# Patient Record
Sex: FEMALE | Race: WHITE | NOT HISPANIC OR LATINO | Employment: FULL TIME | URBAN - NONMETROPOLITAN AREA
[De-identification: names, ages, dates, MRNs, and addresses within clinical notes are randomized per-mention and may not be internally consistent; named-entity substitution may affect disease eponyms.]

---
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
## 2020-03-25 ENCOUNTER — NURSE TRIAGE (OUTPATIENT)
Dept: PEDIATRICS | Facility: OTHER | Age: 20
End: 2020-03-25

## 2020-03-25 NOTE — TELEPHONE ENCOUNTER
Pt states that she has had a low grade fever for a couple days and is starting to develop a sore throat.

## 2020-03-25 NOTE — TELEPHONE ENCOUNTER
S-(situation): Fever/Sore throat    B-(background): Scoliosis, Asperger's syndrome.    A-(assessment): Symptoms started Monday. 99.1 on Monday. Tuesday 99.2 by the end of day 99.6. No fever right now. Mild cough and sore throat still present. Throat looks a little red. No breathing issues. No sinus drainage. Sinuses feel dry. Patient reported normally she wouldn't go to the doctor or call about these symptoms but because of COVID-19 and the place she works she figured she should.     R-(recommendations): Discussed home care strategies. Discussed COVID-19. Discussed coming to the front of the clinic for an office appointment if she feels she needs to be seen. Patient decided to stay home and try some home care strategies, but if symptoms worsen or return she will come to be checked out. Elizabeth Garza RN  ....................  3/25/2020   1:09 PM    Instructions for Patients ([facility] Specific)  Your symptoms could be due to COVID-19, but it also could be due to a number of other respiratory illnesses.  We are not doing testing at this time for COVID-19 unless symptoms are severe enough to require hospitalization.  It is recommended that you stay home to prevent the spread of your illness.  To do this follow these instructions:    Regardless of if you have been tested or not:  Patient who have symptoms (cough, fever, or shortness of breath), need to isolate for 7 days from when symptoms started AND 72 hours after fever resolves (without fever reducing medications) AND improvement of respiratory symptoms (whichever is longer).      Isolate yourself at home (in own room/own bathroom if possible)    Do Not allow any visitors    Do Not go to work or school    Do Not go to Conf[employer]ian[employer],  centers, shopping, or other public places.    Do Not shake hands.    Avoid close and intimate contact with others (hugging, kissing).    Follow CDC recommendations for household cleaning of frequently touched services.     After the  initial 7 days, continue to isolate yourself from household members as much as possible. To continue decrease the risk of community spread and exposure, you and any members of your household should limit activities in public for 14 days after starting home isolation.     You can reference the following CDC link for helpful home isolation/care tips:  https://www.cdc.gov/coronavirus/2019-ncov/downloads/10Things.pdf    Protect Others:    Cover your mouth and nose with a mask, disposable tissue or wash cloth to avoid spreading germs to others.    Wash your hands and face frequently with soap and water.    Fever Medicines:    For fever relief, take acetaminophen or ibuprofen.    Treat fevers above 101  F (38.3  C) to lower fevers and make you more comfortable.     Acetaminophen (e.g., Tylenol): Take 650 mg (two 325 mg pills) by mouth every 4-6 hours as needed of regular strength Tylenol or 1,000 mg (two 500 mg pills) every 8 hours as needed of Extra Strength Tylenol.     Ibuprofen (e.g., Motrin, Advil): Take 400 mg (two 200 mg pills) by mouth every 6 hours as needed.     Acetaminophen is thought to be safer than ibuprofen or naproxen for people over 65 years old. Acetaminophen is in many OTC and prescription medicines. It might be in more than one medicine that you are taking. You need to be careful and not take an overdose. Before taking any medicine, read all the instructions on the package.    Caution - NSAIDs (e.g., ibuprofen, naproxen): Do not take nonsteroidal anti-inflammatory drugs (NSAIDs) if you have stomach problems, kidney disease, heart failure, or other contraindications to using this type of medicine. Do not take NSAID medicines for over 7 days without consulting your PCP. Do not take NSAID medicines if you are pregnant. Do not take NSAID medicines if you are also taking blood thinners.     Call Back If: Breathing difficulty develops or you become worse.    Thank you for limiting contact with others,  wearing a simple mask to cover your cough, practice good hand hygiene habits and accessing our virtual services where possible to limit the spread of this virus.    For more information about COVID19 and options for caring for yourself at home, please visit the CDC website at https://www.cdc.gov/coronavirus/2019-ncov/about/steps-when-sick.html  For more options for care at [facility], please visit our website at [email or web address]         Additional Information    Sore throat    Negative: Fever present > 3 days (72 hours)    Protocols used: SORE THROAT-A-OH

## 2021-11-29 NOTE — NURSING NOTE
Patient Information     Patient Name MRN Sex Ana Abernathy [id number] Female 2000      Nursing Note by Ami Kimball at 2018 10:00 AM     Author:  Ami Kimball Service:  (none) Author Type:  (none)     Filed:  2018 10:17 AM Encounter Date:  2018 Status:  Signed     :  Ami Kimball            Patient presents with pain in her left breast x 3 weeks.  Ami Kimball LPN .........................2018  10:07 AM           66

## 2022-08-05 LAB
CHOLESTEROL (EXTERNAL): 156 MG/DL (ref 0–200)
HBA1C MFR BLD: 5.2 % (ref 4–5.6)
LDL CHOLESTEROL CALCULATED (EXTERNAL): 88 MG/DL (ref 0–100)
NON HDL CHOLESTEROL (EXTERNAL): 112 MG/DL (ref 0–130)
TRIGLYCERIDES (EXTERNAL): 118 MG/DL

## 2022-12-06 LAB
ALT SERPL-CCNC: 21 IU/L (ref 6–31)
AST SERPL-CCNC: 18 IU/L (ref 10–40)
CREATININE (EXTERNAL): 0.81 MG/DL (ref 0.7–1)
GFR ESTIMATED (EXTERNAL): 105 ML/MIN/1.73M2
GLUCOSE (EXTERNAL): 97 MG/DL (ref 70–99)
POTASSIUM (EXTERNAL): 3.9 MEQ/L (ref 3.4–5.1)

## 2022-12-18 ENCOUNTER — OFFICE VISIT (OUTPATIENT)
Dept: FAMILY MEDICINE | Facility: OTHER | Age: 22
End: 2022-12-18
Attending: NURSE PRACTITIONER
Payer: COMMERCIAL

## 2022-12-18 VITALS
TEMPERATURE: 98.4 F | SYSTOLIC BLOOD PRESSURE: 137 MMHG | OXYGEN SATURATION: 98 % | DIASTOLIC BLOOD PRESSURE: 95 MMHG | HEART RATE: 87 BPM | RESPIRATION RATE: 16 BRPM | BODY MASS INDEX: 40.74 KG/M2 | WEIGHT: 244.8 LBS

## 2022-12-18 DIAGNOSIS — H60.393 SKIN OF BOTH EARLOBES WITH INFECTION: Primary | ICD-10-CM

## 2022-12-18 PROCEDURE — 99213 OFFICE O/P EST LOW 20 MIN: CPT | Performed by: NURSE PRACTITIONER

## 2022-12-18 RX ORDER — CEPHALEXIN 500 MG/1
500 CAPSULE ORAL 3 TIMES DAILY
Qty: 21 CAPSULE | Refills: 0 | Status: SHIPPED | OUTPATIENT
Start: 2022-12-18 | End: 2022-12-25

## 2022-12-18 RX ORDER — TESTOSTERONE CYPIONATE 1000 MG/10ML
50 INJECTION, SOLUTION INTRAMUSCULAR
COMMUNITY
Start: 2022-08-11

## 2022-12-18 ASSESSMENT — PAIN SCALES - GENERAL: PAINLEVEL: MODERATE PAIN (4)

## 2022-12-18 NOTE — NURSING NOTE
"Chief Complaint   Patient presents with     Ear Problem     Earlobes red and painful from earrings for 2 days   He is putting gauges in his ears and the holes are now red, sore and painful for the past 2 days.  Mary Melendez LPN..................12/18/2022   9:56 AM      Initial BP (!) 137/95   Pulse 87   Temp 98.4  F (36.9  C) (Temporal)   Resp 16   Wt 111 kg (244 lb 12.8 oz)   LMP  (LMP Unknown)   SpO2 98%   Breastfeeding No   BMI 40.74 kg/m   Estimated body mass index is 40.74 kg/m  as calculated from the following:    Height as of 6/30/19: 1.651 m (5' 5\").    Weight as of this encounter: 111 kg (244 lb 12.8 oz).  Medication Reconciliation: complete    FOOD SECURITY SCREENING QUESTIONS  Hunger Vital Signs:  Within the past 12 months we worried whether our food would run out before we got money to buy more. Never  Within the past 12 months the food we bought just didn't last and we didn't have money to get more. Never        Advance care directive on file? no  Advance care directive provided to patient? declined     Mary Melendez, CHANTE  "
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
## 2023-02-01 LAB — TSH SERPL-ACNC: 1.99 UIU/ML (ref 0.4–3.99)

## 2023-02-08 ENCOUNTER — TRANSFERRED RECORDS (OUTPATIENT)
Dept: MULTI SPECIALTY CLINIC | Facility: CLINIC | Age: 23
End: 2023-02-08

## 2023-02-08 LAB
C TRACH DNA SPEC QL PROBE+SIG AMP: NOT DETECTED
N GONORRHOEA DNA SPEC QL PROBE+SIG AMP: NOT DETECTED
PAP-ABSTRACT: NORMAL
SPECIMEN DESCRIP: NORMAL
SPECIMEN DESCRIPTION: NORMAL

## 2023-12-30 ENCOUNTER — ALLIED HEALTH/NURSE VISIT (OUTPATIENT)
Dept: FAMILY MEDICINE | Facility: OTHER | Age: 23
End: 2023-12-30
Attending: FAMILY MEDICINE
Payer: COMMERCIAL

## 2023-12-30 DIAGNOSIS — U07.1 COVID-19: Primary | ICD-10-CM

## 2023-12-30 DIAGNOSIS — U07.1 INFECTION DUE TO COVID-19 VIRUS VARIANT OF CONCERN: Primary | ICD-10-CM

## 2023-12-30 LAB — SARS-COV-2 RNA RESP QL NAA+PROBE: POSITIVE

## 2023-12-30 PROCEDURE — 87635 SARS-COV-2 COVID-19 AMP PRB: CPT | Mod: ZL

## 2023-12-30 PROCEDURE — C9803 HOPD COVID-19 SPEC COLLECT: HCPCS

## 2023-12-30 PROCEDURE — 99212 OFFICE O/P EST SF 10 MIN: CPT | Mod: 93 | Performed by: STUDENT IN AN ORGANIZED HEALTH CARE EDUCATION/TRAINING PROGRAM

## 2023-12-30 RX ORDER — TRETINOIN 0.5 MG/G
CREAM TOPICAL
COMMUNITY
Start: 2022-12-06

## 2023-12-30 NOTE — PROGRESS NOTES
"The patient has been notified of following:     \"This telephone visit will be conducted via a call between you and your physician/provider. We have found that certain health care needs can be provided without the need for a physical exam.  This service lets us provide the care you need with a short phone conversation.  If a prescription is necessary we can send it directly to your pharmacy.  If lab work is needed we can place an order for that and you can then stop by our lab to have the test done at a later time.    Telephone visits are billed at different rates depending on your insurance coverage. During this emergency period, for some insurers they may be billed the same as an in-person visit.  Please reach out to your insurance provider with any questions.    If during the course of the call the physician/provider feels a telephone visit is not appropriate, you will not be charged for this service.\"    Patient has given verbal consent for Telephone visit?  Yes    What phone number would you like to be contacted at?     How would you like to obtain your AVS? MyChart    Subjective   CC: Ana Hsieh  is a 23 year old adult who presents via phone visit today for the following health issues: No chief complaint on file.       COVID-19 Symptom Review  How many days ago did these symptoms start? 12/29/23    Are any of the following symptoms significant for you?  New or worsening difficulty breathing? Yes  Please describe what kind of difficulty you are having breathing:Mild dyspnea (able to do ADLs without difficulty, mild shortness of breath with activities such as climbing one or two flights of stairs or walking briskly)  Worsening cough? Yes, I am coughing up mucus.  Fever or chills? Yes, the highest temperature was 101.4F  Headache: YES  Sore throat: YES  Chest pain: No  Diarrhea: No  Body aches? YES    What treatments has patient tried? Acetaminophen, Nonsteroidals, and rest, fluids   Does patient live in a nursing " home, group home, or shelter? No  Does patient have a way to get food/medications during quarantined? Yes, I have a friend or family member who can help me.              Reviewed and updated as needed this visit by Provider      Review of Systems   See HPI relevant review of systems        Objective      Vitals not obtained due to telephone visit    Constitutional: Alert and oriented, conversational  Respiratory: Intermittent cough, did not appear short of breath when speaking      Assessment/Plan:    Positive for COVID-19.  Plan to treat with Paxlovid, no contraindications.  Overall symptoms started yesterday.  Has been using over-the-counter medications.  Discussed that if symptoms worsen, may need to be evaluated in rapid clinic or the ER.  Discussed quarantine precautions.  Work note was left at the rapid clinic .    4:08 pm start 4:23 end  Phone call duration:  15 minutes

## 2023-12-30 NOTE — LETTER
December 30, 2023      Ana Hsieh  [address] [address] [facility] [id number]        To Whom It May Concern:    Ana Hsieh  was seen on 12/20/23.  Please excuse him 1/2/24. On 1/4/24 can return to work wearing a mask. On 1/9/24, can return without restriction.         Sincerely,        Andressa Mei PA-C

## 2024-02-15 ENCOUNTER — DOCUMENTATION ONLY (OUTPATIENT)
Dept: OTHER | Facility: CLINIC | Age: 24
End: 2024-02-15
Payer: COMMERCIAL

## (undated) RX ORDER — ACETAMINOPHEN 500 MG
TABLET ORAL
Status: DISPENSED
Start: 2019-06-30